# Patient Record
Sex: MALE | Race: WHITE | NOT HISPANIC OR LATINO | Employment: FULL TIME | URBAN - METROPOLITAN AREA
[De-identification: names, ages, dates, MRNs, and addresses within clinical notes are randomized per-mention and may not be internally consistent; named-entity substitution may affect disease eponyms.]

---

## 2017-03-01 PROBLEM — J30.9 ALLERGIC RHINITIS: Status: ACTIVE | Noted: 2017-03-01

## 2023-03-21 ENCOUNTER — OCCUPATIONAL HEALTH (OUTPATIENT)
Dept: URGENT CARE | Facility: CLINIC | Age: 28
End: 2023-03-21

## 2023-03-21 DIAGNOSIS — Z02.1 PRE-EMPLOYMENT EXAMINATION: Primary | ICD-10-CM

## 2023-03-21 LAB
COLLECTION ONLY: NORMAL

## 2023-03-21 PROCEDURE — 92552 AUDIOGRAM OCC MED: ICD-10-PCS | Mod: S$GLB,,,

## 2023-03-21 PROCEDURE — 80305 PR COLLECTION ONLY DRUG SCREEN: ICD-10-PCS | Mod: S$GLB,,,

## 2023-03-21 PROCEDURE — 99499 PHYSICAL, BASIC COMPLEXITY: ICD-10-PCS | Mod: S$GLB,,,

## 2023-03-21 PROCEDURE — 92552 PURE TONE AUDIOMETRY AIR: CPT | Mod: S$GLB,,,

## 2023-03-21 PROCEDURE — 99172 OCULAR FUNCTION SCREEN: CPT | Mod: S$GLB,,,

## 2023-03-21 PROCEDURE — 80305 DRUG TEST PRSMV DIR OPT OBS: CPT | Mod: S$GLB,,,

## 2023-03-21 PROCEDURE — 99172 VISUAL SCREEN, AUTOMATED W/COLOR VISION: ICD-10-PCS | Mod: S$GLB,,,

## 2023-03-21 PROCEDURE — 99499 UNLISTED E&M SERVICE: CPT | Mod: S$GLB,,,

## 2024-04-08 ENCOUNTER — TELEPHONE (OUTPATIENT)
Dept: NEUROSURGERY | Facility: CLINIC | Age: 29
End: 2024-04-08
Payer: OTHER GOVERNMENT

## 2024-04-08 NOTE — TELEPHONE ENCOUNTER
Called to schedule appointment with Neurosurgery.  No answer.  Unable to LVM.  Patient has outside MRI from [facility].  Needs to be uploaded to chart.

## 2024-04-17 ENCOUNTER — TELEPHONE (OUTPATIENT)
Dept: NEUROSURGERY | Facility: CLINIC | Age: 29
End: 2024-04-17
Payer: OTHER GOVERNMENT

## 2024-04-17 NOTE — TELEPHONE ENCOUNTER
Called to schedule appointment with Neurosurgery.  No answer.  Unable to LVM.  Patient has outside MRI from [facility].  Needs to be uploaded to chart.

## 2024-05-24 ENCOUNTER — TELEPHONE (OUTPATIENT)
Dept: NEUROSURGERY | Facility: CLINIC | Age: 29
End: 2024-05-24
Payer: OTHER GOVERNMENT

## 2024-06-18 ENCOUNTER — OFFICE VISIT (OUTPATIENT)
Dept: NEUROSURGERY | Facility: CLINIC | Age: 29
End: 2024-06-18
Payer: OTHER GOVERNMENT

## 2024-06-18 VITALS
BODY MASS INDEX: 32.79 KG/M2 | HEART RATE: 91 BPM | HEIGHT: 70 IN | SYSTOLIC BLOOD PRESSURE: 150 MMHG | WEIGHT: 229.06 LBS | DIASTOLIC BLOOD PRESSURE: 92 MMHG | RESPIRATION RATE: 18 BRPM

## 2024-06-18 DIAGNOSIS — M54.16 LUMBAR RADICULOPATHY: Primary | ICD-10-CM

## 2024-06-18 DIAGNOSIS — G89.29 CHRONIC MIDLINE LOW BACK PAIN WITHOUT SCIATICA: ICD-10-CM

## 2024-06-18 DIAGNOSIS — M54.50 CHRONIC MIDLINE LOW BACK PAIN WITHOUT SCIATICA: ICD-10-CM

## 2024-06-18 PROCEDURE — 99204 OFFICE O/P NEW MOD 45 MIN: CPT | Mod: S$PBB,,, | Performed by: NEUROLOGICAL SURGERY

## 2024-06-18 NOTE — PROGRESS NOTES
"Neurosurgery History and Physical    Patient ID: Juan Carlos Sam is a 28 y.o. male.    Chief Complaint   Patient presents with    Lumbar Spine Pain (L-Spine)       HPI 24:  Patient is a 28 year old who presents to neurosurgery for evaluation of his low back pain.     Review of Systems   Musculoskeletal:  Positive for back pain and gait problem.   All other systems reviewed and are negative.      Past Medical History:   Diagnosis Date    Allergy      Social History     Socioeconomic History    Marital status: Single   Tobacco Use    Smoking status: Every Day     Current packs/day: 0.00     Average packs/day: 0.5 packs/day for 7.0 years (3.5 ttl pk-yrs)     Types: Cigarettes, Vaping with nicotine     Start date: 2015     Last attempt to quit: 4/10/2024     Years since quittin.1    Smokeless tobacco: Former    Tobacco comments:     quit for a few years and then re-started   Substance and Sexual Activity    Alcohol use: Yes     Alcohol/week: 4.0 standard drinks of alcohol     Types: 4 Cans of beer per week    Drug use: Never    Sexual activity: Yes     Partners: Female     Birth control/protection: Condom     Family History   Problem Relation Name Age of Onset    Diabetes Mother      Depression Mother      Thyroid disease Maternal Grandmother      No Known Problems Father       Review of patient's allergies indicates:  No Known Allergies  No current outpatient medications on file.  Blood pressure (!) 150/92, pulse 91, resp. rate 18, height 5' 10" (1.778 m), weight 103.9 kg (229 lb 0.9 oz).      Neurologic Exam     Mental Status   Oriented to person, place, and time.   Speech: speech is normal   Level of consciousness: alert  Knowledge: good.     Cranial Nerves     CN VII   Facial expression full, symmetric.     Motor Exam     Strength   Right neck flexion: 5/5  Left neck flexion: 5/5  Right neck extension: 5/5  Left neck extension: 5/5  Right deltoid: 5/5  Left deltoid: 5/5  Right biceps: " "5/5  Left biceps: 5/5  Right triceps: 5/5  Left triceps: 5/5  Right wrist flexion: 5/5  Left wrist flexion: 5/5  Right wrist extension: 5/5  Left wrist extension: 5/5  Right interossei: 5/5  Left interossei: 5/5  Right iliopsoas: 5/5  Left iliopsoas: 5/5  Right quadriceps: 5/5  Left quadriceps: 5/5  Right anterior tibial: 5/5  Left anterior tibial: 5/5  Right posterior tibial: 5/5  Left posterior tibial: 5/5  Right peroneal: 5/5  Left peroneal: 5/5  Right gastroc: 5/5  Left gastroc: 5/5    Sensory Exam   Light touch normal.     Gait, Coordination, and Reflexes     Reflexes   Right brachioradialis: 2+  Left brachioradialis: 2+  Right biceps: 2+  Left biceps: 2+  Right triceps: 2+  Left triceps: 2+  Right patellar: 2+  Left patellar: 2+  Right achilles: 2+  Left achilles: 2+      Physical Exam  Vitals and nursing note reviewed.   Neurological:      Mental Status: He is oriented to person, place, and time.      Deep Tendon Reflexes:      Reflex Scores:       Tricep reflexes are 2+ on the right side and 2+ on the left side.       Bicep reflexes are 2+ on the right side and 2+ on the left side.       Brachioradialis reflexes are 2+ on the right side and 2+ on the left side.       Patellar reflexes are 2+ on the right side and 2+ on the left side.       Achilles reflexes are 2+ on the right side and 2+ on the left side.  Psychiatric:         Speech: Speech normal.       Vital Signs  Pulse: 91  Resp: 18  BP: (!) 150/92  BP Location: Right arm  Patient Position: Sitting  Pain Score:   5  Pain Loc: Back  Height and Weight  Height: 5' 10" (177.8 cm)  Weight: 103.9 kg (229 lb 0.9 oz)  BSA (Calculated - sq m): 2.27 sq meters  BMI (Calculated): 32.9  Weight in (lb) to have BMI = 25: 173.9]    Provider dictation:  I reviewed the imaging. ***    Visit Diagnosis:  Chronic midline low back pain without sciatica  -     Ambulatory referral/consult to Neurosurgery        "

## 2024-06-18 NOTE — PROGRESS NOTES
Neurosurgery History and Physical    Patient ID: Juan Carlos Sam is a 28 y.o. male.    Chief Complaint   Patient presents with    Lumbar Spine Pain (L-Spine)     HPI 6/18/24:  Patient is a 28 year old who presents to neurosurgery for back pain. He has had back pain for at least 7 years now without a specific incident or injury. His low back pain will radiate into the right buttocks and thigh down the posterior aspect to the knee only; no symptoms on the left side. Pain is throbbing and achy, worse with different movements, walking or standing for too long, even sitting for too long. Can stand for as long as he wants, just has pain with it. Can grocery shop without leaning on the cart without issues. Has no numbness or tingling in the legs or feet. No bowel or baldder incontinence, no saddle anesthesia. He hasn't taken any medication for his back pain, not even over the counter medications. He has never seen pain management, no physical therapy. He has seen a chiropractor about 1 year ago, no relief of symptoms. Pain is typically a 3-4/10, but will increase with more physical activities. He has not seen any major worsening.     MRIs were ordered by company that he was getting a job with prior to starting. He is not trying to get back to that job, but has another one of similar requirements. 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 He is comfortable performing his job duties as expected as his back pain is manageable.     Review of Systems   Constitutional:  Negative for fever.   Musculoskeletal:  Positive for back pain. Negative for gait problem.   Neurological:  Negative for weakness and numbness.   All other systems reviewed and are negative.      Past Medical History:   Diagnosis Date    Allergy      Social History     Socioeconomic History    Marital status: Single  "  Tobacco Use    Smoking status: Every Day     Current packs/day: 0.00     Average packs/day: 0.5 packs/day for 7.0 years (3.5 ttl pk-yrs)     Types: Cigarettes, Vaping with nicotine     Start date: 2015     Last attempt to quit: 4/10/2024     Years since quittin.1    Smokeless tobacco: Former    Tobacco comments:     quit for a few years and then re-started   Substance and Sexual Activity    Alcohol use: Yes     Alcohol/week: 4.0 standard drinks of alcohol     Types: 4 Cans of beer per week    Drug use: Never    Sexual activity: Yes     Partners: Female     Birth control/protection: Condom     Family History   Problem Relation Name Age of Onset    Diabetes Mother      Depression Mother      Thyroid disease Maternal Grandmother      No Known Problems Father       Review of patient's allergies indicates:  No Known Allergies  No current outpatient medications on file.  Blood pressure (!) 150/92, pulse 91, resp. rate 18, height 5' 10" (1.778 m), weight 103.9 kg (229 lb 0.9 oz).      Neurologic Exam     Mental Status   Oriented to person, place, and time.   Concentration: normal.   Speech: speech is normal   Level of consciousness: alert  Knowledge: good.     Cranial Nerves     CN VII   Facial expression full, symmetric.     Motor Exam   Muscle bulk: normal    Strength   Right deltoid: 5/5  Left deltoid: 5/5  Right biceps: 5/5  Left biceps: 5/5  Right triceps: 5/5  Left triceps: 5/5  Right wrist flexion: 5/5  Left wrist flexion: 5/5  Right wrist extension: 5/5  Left wrist extension: 5/5  Right interossei: 5/5  Left interossei: 5/5  Right iliopsoas: 5/5  Left iliopsoas: 5/5  Right quadriceps: 5/5  Left quadriceps: 5/5  Right anterior tibial: 5/5  Left anterior tibial: 5/5  Right posterior tibial: 5/5  Left posterior tibial: 5/5  Right peroneal: 5/5  Left peroneal: 5/5  Right gastroc: 5/5  Left gastroc: 5/5    Sensory Exam   Light touch normal.   Right arm light touch: normal  Left arm light touch: normal  Right " "leg light touch: normal  Left leg light touch: normal    Gait, Coordination, and Reflexes     Tremor   Resting tremor: absent  Action tremor: absent    Reflexes   Right brachioradialis: 1+  Left brachioradialis: 1+  Right biceps: 1+  Left biceps: 1+  Right triceps: 1+  Left triceps: 1+  Right patellar: 1+  Left patellar: 1+  Right achilles: 1+  Left achilles: 1+  Right plantar: normal  Left plantar: normal  Right Luu: absent  Left Luu: absent  Right ankle clonus: absent  Left ankle clonus: absent      Physical Exam  Vitals and nursing note reviewed.   Neurological:      Mental Status: He is oriented to person, place, and time.      Deep Tendon Reflexes:      Reflex Scores:       Tricep reflexes are 1+ on the right side and 1+ on the left side.       Bicep reflexes are 1+ on the right side and 1+ on the left side.       Brachioradialis reflexes are 1+ on the right side and 1+ on the left side.       Patellar reflexes are 1+ on the right side and 1+ on the left side.       Achilles reflexes are 1+ on the right side and 1+ on the left side.  Psychiatric:         Speech: Speech normal.         Vital Signs  Pulse: 91  Resp: 18  BP: (!) 150/92  BP Location: Right arm  Patient Position: Sitting  Pain Score:   5  Pain Loc: Back  Height and Weight  Height: 5' 10" (177.8 cm)  Weight: 103.9 kg (229 lb 0.9 oz)  BSA (Calculated - sq m): 2.27 sq meters  BMI (Calculated): 32.9  Weight in (lb) to have BMI = 25: 173.9]    Provider dictation:  MRI lumbar spine 4/4/24 is reviewed by Dr. Magallanes and discussed with the patient. L5-S1 disc herniation without significant central or foraminal stenosis; L4-5 disc herniation causing severe right sided foraminal stenosis.    MRI cervical spine 4/4/24 is unremarkable.    XR lumbar spine 2/2021 reveals scoliosis curvature of the lumbar spine.    We discussed conservative treatment options to include PT, chiropractor, pain management consideration of ESIs or other pain procedures, " oral medications vs surgery. We are not recommending surgery at this time. He will return to our clinic as needed if progressive weakness or pain. Discussed proper spine precautions.     Visit Diagnosis:  Chronic midline low back pain without sciatica  -     Ambulatory referral/consult to Neurosurgery

## 2024-07-18 ENCOUNTER — OFFICE VISIT (OUTPATIENT)
Dept: PAIN MEDICINE | Facility: CLINIC | Age: 29
End: 2024-07-18
Payer: OTHER GOVERNMENT

## 2024-07-18 VITALS
HEIGHT: 70 IN | SYSTOLIC BLOOD PRESSURE: 136 MMHG | DIASTOLIC BLOOD PRESSURE: 96 MMHG | WEIGHT: 223.88 LBS | BODY MASS INDEX: 32.05 KG/M2 | HEART RATE: 88 BPM

## 2024-07-18 DIAGNOSIS — M54.16 LUMBAR RADICULITIS: ICD-10-CM

## 2024-07-18 DIAGNOSIS — M54.9 DORSALGIA: Primary | ICD-10-CM

## 2024-07-18 PROCEDURE — 99204 OFFICE O/P NEW MOD 45 MIN: CPT | Mod: S$PBB,,, | Performed by: ANESTHESIOLOGY

## 2024-07-18 PROCEDURE — 99999 PR PBB SHADOW E&M-EST. PATIENT-LVL IV: CPT | Mod: PBBFAC,,, | Performed by: ANESTHESIOLOGY

## 2024-07-18 PROCEDURE — 99214 OFFICE O/P EST MOD 30 MIN: CPT | Mod: PBBFAC,PO | Performed by: ANESTHESIOLOGY

## 2024-07-18 RX ORDER — METHYLPREDNISOLONE 4 MG/1
TABLET ORAL
Qty: 1 EACH | Refills: 0 | Status: SHIPPED | OUTPATIENT
Start: 2024-07-18 | End: 2024-08-08

## 2024-07-18 NOTE — PROGRESS NOTES
[facility] New Patient Evaluation      Referred by: Marily Conley    PCP:     CC:   Chief Complaint   Patient presents with    Back Pain     New patient           No data to display                  HPI:   Juan Carlos Sam is a 29 y.o. male patient who has a past medical history of Allergy. He presents with back pain.  He has a history of chronic back pain for over the past year and a half.  He was applying for a new job and got a lumbar MRI which showed some bulging discs.  Today he reports his pain is 4/10, constant, aching, throbbing with radiating pain from the lower back down towards the right buttock and hip.  His pain is worse with sitting, bending, lifting and relieved with rest and lying down and heat.  He denies any numbness or weakness      Pain Intervention History:      Past Spine Surgical History:      Past and current medications:  Antineuropathics:  NSAIDs:  Physical therapy:  Antidepressants:  Muscle relaxers:  Opioids:  Antiplatelets/Anticoagulants:    History:    Current Outpatient Medications:     losartan (COZAAR) 50 MG tablet, Take 1 tablet (50 mg total) by mouth nightly., Disp: 30 tablet, Rfl: 1    omeprazole (PRILOSEC) 20 MG capsule, Take 1 capsule (20 mg total) by mouth once daily., Disp: 30 capsule, Rfl: 2    methylPREDNISolone (MEDROL DOSEPACK) 4 mg tablet, use as directed, Disp: 1 each, Rfl: 0    Past Medical History:   Diagnosis Date    Allergy        Past Surgical History:   Procedure Laterality Date    CYST REMOVAL      left cheek       Family History   Problem Relation Name Age of Onset    Diabetes Mother      Depression Mother      Thyroid disease Maternal Grandmother      No Known Problems Father         Social History     Socioeconomic History    Marital status: Single   Tobacco Use    Smoking status: Every Day     Current packs/day: 0.00     Average packs/day: 0.5 packs/day for 7.0 years (3.5 ttl pk-yrs)     Types: Cigarettes, Vaping with nicotine     Start date:  "2015     Last attempt to quit: 4/10/2024     Years since quittin.2    Smokeless tobacco: Former    Tobacco comments:     quit for a few years and then re-started   Substance and Sexual Activity    Alcohol use: Not Currently     Alcohol/week: 4.0 standard drinks of alcohol     Types: 4 Cans of beer per week    Drug use: Never    Sexual activity: Yes     Partners: Female     Birth control/protection: Condom       Review of patient's allergies indicates:  No Known Allergies    Review of Systems:  12 point review of systems is negative.    Physical Exam:  Vitals:    24 0837   BP: (!) 136/96   Pulse: 88   Weight: 101.5 kg (223 lb 14 oz)   Height: 5' 10" (1.778 m)   PainSc:   4   PainLoc: Back     Body mass index is 32.12 kg/m².    Gen: NAD  Psych: mood appropriate for given condition  HEENT: eyes anicteric   CV: RRR  HEENT: anicteric   Respiratory: non-labored, no signs of respiratory distress  Abd: non-distended  Skin: warm, dry and intact.  Gait: No antalgic gait.     Sensory:  Intact and symmetrical to light touch in L1-S1 dermatomes bilaterally.    Motor:     Right Left   L2/3 Iliacus Hip flexion  5  5   L3/4 Qudratus Femoris Knee Extension  5  5   L4/5 Tib Anterior Ankle Dorsiflexion   5  5   L5/S1 Extensor Hallicus Longus Great toe extension  5  5   S1/S2 Gastroc/Soleus Plantar Flexion  5  5      Right Left                  Patellar DTR 2+ 2+   Achilles DTR 2+ 2+                      Labs:  Lab Results   Component Value Date    HGBA1C 5.1 2024       Lab Results   Component Value Date    WBC 5.75 2024    HGB 14.7 2024    HCT 41.7 2024    MCV 86 2024     2024       Imaging:  MRI lumbar spine 24  FINDINGS:   Spine Numbering: For purposes of this dictation, it is assumed that there are 5 non-rib-bearing, lumbar-type vertebrae, and the most caudal fully segmented lumbar vertebra is labeled L5.     Alignment: Straightening of normal lumbar lordosis. Trace grade " 1 retrolisthesis of L5 on S1.     Surgical: None.     Vertebral Bodies: Normal in height.     Marrow Signal: Expected. No suspicious osseous lesions.     Intervertebral Discs: Disc desiccation with mild height loss at L4-5 and L5-S1.     Conus Medullaris:  Terminates at L1     Cauda Equina: Normal     Paraspinal Soft Tissues: Normal     Intra-abdominal Findings: None.     Individual Levels:   T12-L1: Normal   L1-L2: Normal   L2-L3:  Normal   L3-L4:  Normal   L4-L5:  Circumferential disc bulge and epidural lipomatosis images cause mild thecal sac narrowing and subarticular zone narrowing. Right foraminal disc extrusion measures 1.4 x 0.9 x 0.7 cm (image 15, series 601; image 5, series 301) with associated high intensity zone. Mild facet arthropathy. Right foraminal disc herniation causes severe right neural foraminal narrowing with impingement of the exiting right L4 nerve root.   L5-S1:  Circumferential disc bulge. No thecal sac narrowing. Right subarticular disc protrusion measuring 1.0 x 0.8 0.5 cm (image 6, series 601; image 5, series 301) with associated annular fissure. Mild facet arthropathy and disc disease cause moderate right neural foraminal narrowing.       Assessment:   Problem List Items Addressed This Visit    None  Visit Diagnoses       Dorsalgia    -  Primary    Relevant Orders    Ambulatory referral/consult to Physical/Occupational Therapy    Lumbar radiculitis        Relevant Orders    Ambulatory referral/consult to Physical/Occupational Therapy              Juan Carlos Sam is a 29 y.o. male patient who has a past medical history of Allergy. He presents with back pain.  He has a history of chronic back pain for over the past year and a half.  He was applying for a new job and got a lumbar MRI which showed some bulging discs.  Today he reports his pain is 4/10, constant, aching, throbbing with radiating pain from the lower back down towards the right buttock and hip.  His pain is worse with  sitting, bending, lifting and relieved with rest and lying down and heat.  He denies any numbness or weakness    - on exam he has full strength in his lower extremities and intact sensation to light touch bilateral L2-S1  - I independently reviewed his lumbar MRI and at L4-5 he has a right foraminal disc extrusion causing right lateral recess and right foraminal narrowing.  At L5-S1 he has a right subarticular disc protrusion with annular fissure  - I think he is suffering from lumbar radiculitis  - he has not done any conservative treatment.  At this time I recommend we maximize conservative options.  I have prescribed a Medrol Dosepak for inflammatory pain and also placed a referral for him to start formal physical therapy.  He is going to follow up with us in 6-8 weeks.  If he fails to find significant improvement we can consider lumbar DEION.  He understands if he finds worsening pain with PT to reach out to me sooner in his follow up      : Not applicable    Az Morgan M.D.  Interventional Pain Medicine / Anesthesiology    This note was completed with dictation software and grammatical errors may exist.

## 2024-08-20 ENCOUNTER — TELEPHONE (OUTPATIENT)
Dept: PAIN MEDICINE | Facility: CLINIC | Age: 29
End: 2024-08-20
Payer: OTHER GOVERNMENT

## 2025-04-22 ENCOUNTER — HOSPITAL ENCOUNTER (EMERGENCY)
Facility: HOSPITAL | Age: 30
Discharge: HOME | End: 2025-04-22
Attending: EMERGENCY MEDICINE
Payer: OTHER GOVERNMENT

## 2025-04-22 VITALS
TEMPERATURE: 98 F | OXYGEN SATURATION: 99 % | HEIGHT: 70 IN | DIASTOLIC BLOOD PRESSURE: 81 MMHG | WEIGHT: 210 LBS | BODY MASS INDEX: 30.06 KG/M2 | RESPIRATION RATE: 16 BRPM | HEART RATE: 73 BPM | SYSTOLIC BLOOD PRESSURE: 129 MMHG

## 2025-04-22 DIAGNOSIS — R03.0 ELEVATED BLOOD PRESSURE READING: Primary | ICD-10-CM

## 2025-04-22 LAB
BILIRUB UR QL STRIP.AUTO: NEGATIVE
BUN SERPL-MCNC: 8 MG/DL (ref 6–30)
CHLORIDE SERPL-SCNC: 104 MMOL/L (ref 95–110)
CLARITY UR: CLEAR
COLOR UR AUTO: COLORLESS
CREAT SERPL-MCNC: 0.8 MG/DL (ref 0.5–1.4)
GLUCOSE SERPL-MCNC: 100 MG/DL (ref 70–110)
GLUCOSE UR QL STRIP: NEGATIVE
HCT VFR BLD CALC: 44 %PCV (ref 36–54)
HGB UR QL STRIP: NEGATIVE
HOLD SPECIMEN: NORMAL
KETONES UR QL STRIP: NEGATIVE
LEUKOCYTE ESTERASE UR QL STRIP: NEGATIVE
NITRITE UR QL STRIP: NEGATIVE
OHS QRS DURATION: 90 MS
OHS QTC CALCULATION: 440 MS
PH UR STRIP: 6 [PH]
POC IONIZED CALCIUM: 0.97 MMOL/L (ref 1.06–1.42)
POC TCO2 (MEASURED): 24 MMOL/L (ref 23–29)
POTASSIUM BLD-SCNC: 4.8 MMOL/L (ref 3.5–5.1)
PROT UR QL STRIP: NEGATIVE
SAMPLE: ABNORMAL
SODIUM BLD-SCNC: 135 MMOL/L (ref 136–145)
SP GR UR STRIP: 1
UROBILINOGEN UR STRIP-ACNC: NEGATIVE EU/DL

## 2025-04-22 PROCEDURE — 93005 ELECTROCARDIOGRAM TRACING: CPT

## 2025-04-22 PROCEDURE — 93010 ELECTROCARDIOGRAM REPORT: CPT | Mod: ,,, | Performed by: STUDENT IN AN ORGANIZED HEALTH CARE EDUCATION/TRAINING PROGRAM

## 2025-04-22 PROCEDURE — 99283 EMERGENCY DEPT VISIT LOW MDM: CPT | Mod: 25

## 2025-04-22 PROCEDURE — 81003 URINALYSIS AUTO W/O SCOPE: CPT | Performed by: PHYSICIAN ASSISTANT

## 2025-04-22 PROCEDURE — 80047 BASIC METABLC PNL IONIZED CA: CPT

## 2025-04-22 PROCEDURE — 25000003 PHARM REV CODE 250: Performed by: PHYSICIAN ASSISTANT

## 2025-04-22 RX ORDER — AMLODIPINE BESYLATE 5 MG/1
5 TABLET ORAL
Status: COMPLETED | OUTPATIENT
Start: 2025-04-22 | End: 2025-04-22

## 2025-04-22 RX ADMIN — AMLODIPINE BESYLATE 5 MG: 5 TABLET ORAL at 10:04

## 2025-04-22 NOTE — ED TRIAGE NOTES
Juan Carlos Sam, a 29 y.o. male presents to the ED w/ complaint of hypertension associated with occipital headache 4/10. Pt reports not missing any doses of amlodipine. C/o right facial/arm tingling. Aaox4, ambulatory.

## 2025-04-22 NOTE — PROVIDER PROGRESS NOTES - EMERGENCY DEPT.
Encounter Date: 4/22/2025    ED Physician Progress Notes          EKG: Rate 85, normal sinus rhythm, no STEMI

## 2025-04-22 NOTE — DISCHARGE INSTRUCTIONS
Check your blood pressure a couple of times this week and keep a journal to present to your primary care provider.  They may want to adjust her medications if you are running high.  Return to the emergency department with any worsening symptoms or concerns.  Thank you for allowing us to take care of you today.

## 2025-04-22 NOTE — ED NOTES
I-STAT Chem-8+ Results:   Value Reference Range   Sodium 135 136-145 mmol/L   Potassium  4.8 3.5-5.1 mmol/L   Chloride 104  mmol/L   Ionized Calcium 0.97 1.06-1.42 mmol/L   CO2 (measured) 24 23-29 mmol/L   Glucose 100  mg/dL   BUN 8 6-30 mg/dL   Creatinine 0.8 0.5-1.4 mg/dL   Hematocrit 44 36-54%

## 2025-04-22 NOTE — Clinical Note
"Juan Carlos Deleon" Flaco was seen and treated in our emergency department on 4/22/2025.  He may return to work on 04/23/2025.       If you have any questions or concerns, please don't hesitate to call.      Zahira Cuevas PA-C"

## 2025-04-22 NOTE — ED PROVIDER NOTES
Encounter Date: 4/22/2025       History     Chief Complaint   Patient presents with    Hypertension     150/100 at home taking meds as prescribed per pt     Patient is a 29-year-old male with history of hypertension who presents to the emergency department with concern for elevated blood pressure.  Patient reports over the last year he has started blood pressure medication with a changed to Norvasc in the last several months.  Patient reports he has been compliant with his Norvasc.  Reports typically when his blood pressure is elevated he becomes flushed and has tingling in his face.  Reports he was at work this morning.  Woke up at 4:00 a.m. and took his medication.  Reports he ate a donut and drank a cup of coffee.  Reports while at work he felt the flushing and tingling in his face.  Reports he checked his pressure and it was 150/100.  Reports typically if he rechecks in the blood pressure improves but this time it did not.  Reports he decided to come to the emergency department because he did not want to have a stroke.    The history is provided by the patient.     Review of patient's allergies indicates:  No Known Allergies  Past Medical History:   Diagnosis Date    Allergy      Past Surgical History:   Procedure Laterality Date    CYST REMOVAL      left cheek     Family History   Problem Relation Name Age of Onset    Diabetes Mother      Depression Mother      Thyroid disease Maternal Grandmother      No Known Problems Father       Social History[1]  Review of Systems   Constitutional:  Negative for activity change, appetite change, chills, fatigue and fever.        Flushing and tingling in face   HENT:  Negative for congestion, ear discharge, ear pain, postnasal drip, rhinorrhea, sore throat and trouble swallowing.    Respiratory:  Negative for cough and shortness of breath.    Cardiovascular:  Negative for chest pain and leg swelling.   Gastrointestinal:  Negative for abdominal pain, blood in stool,  constipation, diarrhea, nausea and vomiting.   Genitourinary:  Negative for dysuria, flank pain and hematuria.   Musculoskeletal:  Negative for back pain, neck pain and neck stiffness.   Skin:  Negative for rash and wound.   Neurological:  Negative for dizziness, weakness, light-headedness and headaches.       Physical Exam     Initial Vitals [04/22/25 0926]   BP Pulse Resp Temp SpO2   (!) 160/107 92 18 98.3 °F (36.8 °C) 99 %      MAP       --         Physical Exam    Nursing note and vitals reviewed.  Constitutional: He appears well-developed and well-nourished. He is not diaphoretic.  Non-toxic appearance. No distress.   HENT:   Head: Normocephalic.   Right Ear: Hearing, external ear and ear canal normal. A middle ear effusion is present.   Left Ear: Hearing, external ear and ear canal normal. A middle ear effusion is present.   Nose: Nose normal. Mouth/Throat: Uvula is midline, oropharynx is clear and moist and mucous membranes are normal. No oropharyngeal exudate.   Eyes: Conjunctivae and EOM are normal. Pupils are equal, round, and reactive to light.   Neck:   Normal range of motion.  Cardiovascular:  Normal rate and regular rhythm.           No lower extremity edema   Pulmonary/Chest: Breath sounds normal.   Abdominal: Abdomen is soft. Bowel sounds are normal. He exhibits no distension and no mass. There is no abdominal tenderness. There is no rebound and no guarding.   Musculoskeletal:         General: Normal range of motion.      Cervical back: Normal range of motion.     Neurological: He is alert and oriented to person, place, and time. He has normal strength. No cranial nerve deficit or sensory deficit. He displays a negative Romberg sign. Coordination and gait normal. GCS score is 15. GCS eye subscore is 4. GCS verbal subscore is 5. GCS motor subscore is 6.   Skin: Skin is warm and dry. Capillary refill takes less than 2 seconds.   Psychiatric: He has a normal mood and affect.         ED Course    Procedures  Labs Reviewed   URINALYSIS, REFLEX TO URINE CULTURE - Abnormal       Result Value    Color, UA Colorless (*)     Appearance, UA Clear      pH, UA 6.0      Spec Grav UA 1.005      Protein, UA Negative      Glucose, UA Negative      Ketones, UA Negative      Bilirubin, UA Negative      Blood, UA Negative      Nitrites, UA Negative      Urobilinogen, UA Negative      Leukocyte Esterase, UA Negative     ISTAT PROCEDURE - Abnormal    POC Glucose 100      POC BUN 8      POC Creatinine 0.8      POC Sodium 135 (*)     POC Potassium 4.8      POC Chloride 104      POC TCO2 (MEASURED) 24      POC Ionized Calcium 0.97 (*)     POC Hematocrit 44      Sample MANUELA     [place] URINE HOLD   ISTAT CHEM8     EKG Readings: (Independently Interpreted)   Initial Reading: No STEMI. Rhythm: Normal Sinus Rhythm.     ECG Results              EKG 12-lead (Final result)        Collection Time Result Time QRS Duration [facility] QTC Calculation    04/22/25 10:08:11 04/22/25 10:15:41 90 440                     Final result by Interface, Lab In [facility] (04/22/25 10:15:44)                   Narrative:    Test Reason : R03.0,    Vent. Rate :  85 BPM     Atrial Rate :  85 BPM     P-R Int : 162 ms          QRS Dur :  90 ms      QT Int : 370 ms       P-R-T Axes :  33   7  51 degrees    QTcB Int : 440 ms    Normal sinus rhythm  Normal ECG  When compared with ECG of 07-Jul-2024 09:22,  No significant change was found    Confirmed by Juan Carlos Cabrera ([id number]) on 4/22/2025 10:15:38 AM    Referred By: AAAREFERRAL SELF           Confirmed By: Juan Carlos Cabrera                                  Imaging Results    None          Medications   amLODIPine tablet 5 mg (5 mg Oral Given 4/22/25 1028)     Medical Decision Making  Urgent evaluation of a 29-year-old male with history of hypertension who presents to the emergency department with concern for elevated blood pressure.  In triage, patient's blood pressure is 160/107.  He is reporting sensation of  flushing and tingling in his face.  Normal neuro exam.  No concern for acute intracranial process.  Patient does appear slightly anxious.  Will obtain basic labs although doubt any end-organ damage.  Will recheck blood pressures.    11:22 AM  Normal creatinine.  No protein in urine.  EKG shows no acute ischemia.  Patient is encouraged to check blood pressure a couple of times this week to keep a journal.  Present to PCP for possible change in medications.  Advised to return to the emergency department with any worsening symptoms or concerns.    Risk  Prescription drug management.                                      Clinical Impression:  Final diagnoses:  [R03.0] Elevated blood pressure reading (Primary)          ED Disposition Condition    Discharge Good          ED Prescriptions    None       Follow-up Information    None              [1]   Social History  Tobacco Use    Smoking status: Every Day     Current packs/day: 0.00     Average packs/day: 0.5 packs/day for 7.0 years (3.5 ttl pk-yrs)     Types: Cigarettes, Vaping with nicotine     Start date: 2015     Last attempt to quit: 4/10/2024     Years since quittin.0    Smokeless tobacco: Former    Tobacco comments:     quit for a few years and then re-started   Substance Use Topics    Alcohol use: Not Currently     Alcohol/week: 4.0 standard drinks of alcohol     Types: 4 Cans of beer per week    Drug use: Never        Zahira Cuevas PA-C  [id number]